# Patient Record
Sex: FEMALE | Race: WHITE | NOT HISPANIC OR LATINO
[De-identification: names, ages, dates, MRNs, and addresses within clinical notes are randomized per-mention and may not be internally consistent; named-entity substitution may affect disease eponyms.]

---

## 2018-07-25 ENCOUNTER — HOSPITAL (OUTPATIENT)
Dept: OTHER | Age: 48
End: 2018-07-25

## 2018-08-15 ENCOUNTER — HOSPITAL (OUTPATIENT)
Dept: OTHER | Age: 48
End: 2018-08-15

## 2019-02-28 ENCOUNTER — LAB REQUISITION (OUTPATIENT)
Dept: LAB | Facility: HOSPITAL | Age: 49
End: 2019-02-28
Payer: COMMERCIAL

## 2019-02-28 DIAGNOSIS — Z01.89 ENCOUNTER FOR OTHER SPECIFIED SPECIAL EXAMINATIONS: ICD-10-CM

## 2019-02-28 PROCEDURE — 87102 FUNGUS ISOLATION CULTURE: CPT | Performed by: OPHTHALMOLOGY

## 2019-02-28 PROCEDURE — 87077 CULTURE AEROBIC IDENTIFY: CPT | Performed by: OPHTHALMOLOGY

## 2019-02-28 PROCEDURE — 87206 SMEAR FLUORESCENT/ACID STAI: CPT | Performed by: OPHTHALMOLOGY

## 2019-02-28 PROCEDURE — 87075 CULTR BACTERIA EXCEPT BLOOD: CPT | Performed by: OPHTHALMOLOGY

## 2019-02-28 PROCEDURE — 87205 SMEAR GRAM STAIN: CPT | Performed by: OPHTHALMOLOGY

## 2019-02-28 PROCEDURE — 87070 CULTURE OTHR SPECIMN AEROBIC: CPT | Performed by: OPHTHALMOLOGY

## 2019-10-10 PROBLEM — I78.9 OTHER AND UNSPECIFIED CAPILLARY DISEASES: Status: ACTIVE | Noted: 2018-12-12

## 2019-10-10 PROBLEM — H35.30 MACULAR DEGENERATION (SENILE) OF RETINA: Status: ACTIVE | Noted: 2018-12-12

## 2019-10-10 PROBLEM — E78.00 PURE HYPERCHOLESTEROLEMIA: Status: ACTIVE | Noted: 2018-12-12

## 2019-10-10 PROBLEM — F41.1 GENERALIZED ANXIETY DISORDER: Status: ACTIVE | Noted: 2018-06-21

## 2019-10-10 PROBLEM — I10 BENIGN ESSENTIAL HYPERTENSION: Status: ACTIVE | Noted: 2018-12-05

## 2020-09-30 PROBLEM — M67.431 GANGLION CYST OF DORSUM OF RIGHT WRIST: Status: ACTIVE | Noted: 2020-09-30

## 2020-09-30 PROBLEM — M77.11 RIGHT LATERAL EPICONDYLITIS: Status: ACTIVE | Noted: 2020-09-30

## 2022-05-17 ENCOUNTER — IMAGING SERVICES (OUTPATIENT)
Dept: CT IMAGING | Age: 52
End: 2022-05-17
Attending: OTOLARYNGOLOGY

## 2022-05-17 DIAGNOSIS — J32.9 CHRONIC SINUSITIS: ICD-10-CM

## 2022-05-17 PROCEDURE — 70486 CT MAXILLOFACIAL W/O DYE: CPT | Performed by: STUDENT IN AN ORGANIZED HEALTH CARE EDUCATION/TRAINING PROGRAM

## 2024-02-08 ENCOUNTER — APPOINTMENT (OUTPATIENT)
Dept: URBAN - METROPOLITAN AREA CLINIC 245 | Age: 54
Setting detail: DERMATOLOGY
End: 2024-02-08

## 2024-02-08 DIAGNOSIS — L57.8 OTHER SKIN CHANGES DUE TO CHRONIC EXPOSURE TO NONIONIZING RADIATION: ICD-10-CM

## 2024-02-08 DIAGNOSIS — D18.0 HEMANGIOMA: ICD-10-CM

## 2024-02-08 DIAGNOSIS — L81.4 OTHER MELANIN HYPERPIGMENTATION: ICD-10-CM

## 2024-02-08 DIAGNOSIS — L82.1 OTHER SEBORRHEIC KERATOSIS: ICD-10-CM

## 2024-02-08 DIAGNOSIS — D49.2 NEOPLASM OF UNSPECIFIED BEHAVIOR OF BONE, SOFT TISSUE, AND SKIN: ICD-10-CM

## 2024-02-08 PROBLEM — D18.01 HEMANGIOMA OF SKIN AND SUBCUTANEOUS TISSUE: Status: ACTIVE | Noted: 2024-02-08

## 2024-02-08 PROCEDURE — OTHER COUNSELING: OTHER

## 2024-02-08 PROCEDURE — OTHER BIOPSY BY SHAVE METHOD: OTHER

## 2024-02-08 PROCEDURE — A4550 SURGICAL TRAYS: HCPCS

## 2024-02-08 PROCEDURE — OTHER MIPS QUALITY: OTHER

## 2024-02-08 PROCEDURE — 11102 TANGNTL BX SKIN SINGLE LES: CPT

## 2024-02-08 PROCEDURE — 99213 OFFICE O/P EST LOW 20 MIN: CPT | Mod: 25

## 2024-02-08 PROCEDURE — OTHER PRESCRIPTION MEDICATION MANAGEMENT: OTHER

## 2024-02-08 ASSESSMENT — LOCATION SIMPLE DESCRIPTION DERM
LOCATION SIMPLE: CHEST
LOCATION SIMPLE: LEFT UPPER BACK
LOCATION SIMPLE: RIGHT UPPER ARM
LOCATION SIMPLE: LEFT FOREHEAD
LOCATION SIMPLE: SUPERIOR FOREHEAD

## 2024-02-08 ASSESSMENT — LOCATION ZONE DERM
LOCATION ZONE: ARM
LOCATION ZONE: TRUNK
LOCATION ZONE: FACE

## 2024-02-08 ASSESSMENT — LOCATION DETAILED DESCRIPTION DERM
LOCATION DETAILED: RIGHT MEDIAL SUPERIOR CHEST
LOCATION DETAILED: RIGHT ANTERIOR PROXIMAL UPPER ARM
LOCATION DETAILED: MIDDLE STERNUM
LOCATION DETAILED: SUPERIOR MID FOREHEAD
LOCATION DETAILED: LEFT SUPERIOR MEDIAL UPPER BACK
LOCATION DETAILED: LEFT INFERIOR MEDIAL FOREHEAD

## 2024-02-08 NOTE — PROCEDURE: BIOPSY BY SHAVE METHOD
Body Location Override (Optional - Billing Will Still Be Based On Selected Body Map Location If Applicable): right upper arm
Detail Level: Detailed
Depth Of Biopsy: dermis
Was A Bandage Applied: Yes
Size Of Lesion In Cm: 0
Biopsy Type: H and E
Biopsy Method: Dermablade
Anesthesia Type: 1% lidocaine with epinephrine
Anesthesia Volume In Cc: 0.5
Hemostasis: Drysol
Wound Care: Petrolatum
Dressing: bandage
Destruction After The Procedure: No
Type Of Destruction Used: Curettage
Curettage Text: The wound bed was treated with curettage after the biopsy was performed.
Cryotherapy Text: The wound bed was treated with cryotherapy after the biopsy was performed.
Electrodesiccation Text: The wound bed was treated with electrodesiccation after the biopsy was performed.
Electrodesiccation And Curettage Text: The wound bed was treated with electrodesiccation and curettage after the biopsy was performed.
Silver Nitrate Text: The wound bed was treated with silver nitrate after the biopsy was performed.
Lab: -[id number]
Consent: Written consent was obtained and risks were reviewed including but not limited to scarring, infection, bleeding, scabbing, incomplete removal, nerve damage and allergy to anesthesia.
Post-Care Instructions: I reviewed with the patient in detail post-care instructions. Patient is to keep the biopsy site dry overnight, and then apply bacitracin twice daily until healed. Patient may apply hydrogen peroxide soaks to remove any crusting.
Notification Instructions: Patient will be notified of biopsy results. However, patient instructed to call the office if not contacted within 2 weeks.
Billing Type: Third-Party Bill
Information: Selecting Yes will display possible errors in your note based on the variables you have selected. This validation is only offered as a suggestion for you. PLEASE NOTE THAT THE VALIDATION TEXT WILL BE REMOVED WHEN YOU FINALIZE YOUR NOTE. IF YOU WANT TO FAX A PRELIMINARY NOTE YOU WILL NEED TO TOGGLE THIS TO 'NO' IF YOU DO NOT WANT IT IN YOUR FAXED NOTE.
21-Oct-2023

## 2024-02-08 NOTE — PROCEDURE: PRESCRIPTION MEDICATION MANAGEMENT
Detail Level: Zone
Initiate Treatment: .\\n- EltaMD UV clear, QDAILY
Render In Strict Bullet Format?: No

## 2024-09-25 NOTE — DISCHARGE INSTRUCTIONS
HOME INSTRUCTIONS  Go to doc appt tomorrow as scheduled  Bring eye drops to appt as instructed   Maintain eye shield until doc removes it  Keep head down until instructed to do otherwise by doc.  AMBSURG HOME CARE INSTRUCTIONS: POST-OP ANESTHESIA  The medication that you received for sedation or general anesthesia can last up to 24 hours. Your judgment and reflexes may be altered, even if you feel like your normal self.      We Recommend:   Do not drive any motor vehicle or bicycle   Avoid mowing the lawn, playing sports, or working with power tools/applicances (power saws, electric knives or mixers)   That you have someone stay with you on your first night home   Do not drink alcohol or take sleeping pills or tranquilizers   Do not sign legal documents within 24 hours of your procedure   If you had a nerve block for your surgery, take extra care not to put any pressure on your arm or hand for 24 hours    It is normal:  For you to have a sore throat if you had a breathing tube during surgery (while you were asleep!). The sore throat should get better within 48 hours. You can gargle with warm salt water (1/2 tsp in 4 oz warm water) or use a throat lozenge for comfort  To feel muscle aches or soreness especially in the abdomen, chest or neck. The achy feeling should go away in the next 24 hours  To feel weak, sleepy or \"wiped out\". Your should start feeling better in the next 24 hours.   To experience mild discomforts such as sore lip or tongue, headache, cramps, gas pains or a bloated feeling in your abdomen.   To experience mild back pain or soreness for a day or two if you had spinal or epidural anesthesia.   If you had laparoscopic surgery, to feel shoulder pain or discomfort on the day of surgery.   For some patients to have nausea after surgery/anesthesia    If you feel nausea or experience vomiting:   Try to move around less.   Eat less than usual or drink only liquids until the next morning   Nausea should  resolve in about 24 hours    If you have a problem when you are at home:    Call your surgeons office   Discharge Instructions: After Your Surgery  You’ve just had surgery. During surgery, you were given medicine called anesthesia to keep you relaxed and free of pain. After surgery, you may have some pain or nausea. This is common. Here are some tips for feeling better and getting well after surgery.   Going home  Your healthcare provider will show you how to take care of yourself when you go home. They'll also answer your questions. Have an adult family member or friend drive you home. For the first 24 hours after your surgery:   Don't drive or use heavy equipment.  Don't make important decisions or sign legal papers.  Take medicines as directed.  Don't drink alcohol.  Have someone stay with you, if needed. They can watch for problems and help keep you safe.  Be sure to go to all follow-up visits with your healthcare provider. And rest after your surgery for as long as your provider tells you to.   Coping with pain  If you have pain after surgery, pain medicine will help you feel better. Take it as directed, before pain becomes severe. Also, ask your healthcare provider or pharmacist about other ways to control pain. This might be with heat, ice, or relaxation. And follow any other instructions your surgeon or nurse gives you.      Stay on schedule with your medicine.     Tips for taking pain medicine  To get the best relief possible, remember these points:   Pain medicines can upset your stomach. Taking them with a little food may help.  Most pain relievers taken by mouth need at least 20 to 30 minutes to start to work.  Don't wait till your pain becomes severe before you take your medicine. Try to time your medicine so that you can take it before starting an activity. This might be before you get dressed, go for a walk, or sit down for dinner.  Constipation is a common side effect of some pain medicines. Call  your healthcare provider before taking any medicines such as laxatives or stool softeners to help ease constipation. Also ask if you should skip any foods. Drinking lots of fluids and eating foods such as fruits and vegetables that are high in fiber can also help. Remember, don't take laxatives unless your surgeon has prescribed them.  Drinking alcohol and taking pain medicine can cause dizziness and slow your breathing. It can even be deadly. Don't drink alcohol while taking pain medicine.  Pain medicine can make you react more slowly to things. Don't drive or run machinery while taking pain medicine.  Your healthcare provider may tell you to take acetaminophen to help ease your pain. Ask them how much you're supposed to take each day. Acetaminophen or other pain relievers may interact with your prescription medicines or other over-the-counter (OTC) medicines. Some prescription medicines have acetaminophen and other ingredients in them. Using both prescription and OTC acetaminophen for pain can cause you to accidentally overdose. Read the labels on your OTC medicines with care. This will help you to clearly know the list of ingredients, how much to take, and any warnings. It may also help you not take too much acetaminophen. If you have questions or don't understand the information, ask your pharmacist or healthcare provider to explain it to you before you take the OTC medicine.   Managing nausea  Some people have an upset stomach (nausea) after surgery. This is often because of anesthesia, pain, or pain medicine, less movement of food in the stomach, or the stress of surgery. These tips will help you handle nausea and eat healthy foods as you get better. If you were on a special food plan before surgery, ask your healthcare provider if you should follow it while you get better. Check with your provider on how your eating should progress. It may depend on the surgery you had. These general tips may help:   Don't  push yourself to eat. Your body will tell you when to eat and how much.  Start off with clear liquids and soup. They're easier to digest.  Next try semi-solid foods as you feel ready. These include mashed potatoes, applesauce, and gelatin.  Slowly move to solid foods. Don’t eat fatty, rich, or spicy foods at first.  Don't force yourself to have 3 large meals a day. Instead eat smaller amounts more often.  Take pain medicines with a small amount of solid food, such as crackers or toast. This helps prevent nausea.  When to call your healthcare provider  Call your healthcare provider right away if you have any of these:   You still have too much pain, or the pain gets worse, after taking the medicine. The medicine may not be strong enough. Or there may be a complication from the surgery.  You feel too sleepy, dizzy, or groggy. The medicine may be too strong.  Side effects such as nausea or vomiting. Your healthcare provider may advise taking other medicines to .  Skin changes such as rash, itching, or hives. This may mean you have an allergic reaction. Your provider may advise taking other medicines.  The incision looks different (for instance, part of it opens up).  Bleeding or fluid leaking from the incision site, and weren't told to expect that.  Fever of 100.4°F (38°C) or higher, or as directed by your provider.  Call 911  Call 911 right away if you have:   Trouble breathing  Facial swelling    If you have obstructive sleep apnea   You were given anesthesia medicine during surgery to keep you comfortable and free of pain. After surgery, you may have more apnea spells because of this medicine and other medicines you were given. The spells may last longer than normal.    At home:  Keep using the continuous positive airway pressure (CPAP) device when you sleep. Unless your healthcare provider tells you not to, use it when you sleep, day or night. CPAP is a common device used to treat obstructive sleep apnea.  Talk  with your provider before taking any pain medicine, muscle relaxants, or sedatives. Your provider will tell you about the possible dangers of taking these medicines.  Contact your provider if your sleeping changes a lot even when taking medicines as directed.  [place] last reviewed this educational content on 10/1/2021  © 0190-4962 [employer]. All rights reserved. This information is not intended as a substitute for professional medical care. Always follow your healthcare professional's instructions.        Discharge Instructions: After Your Surgery  You’ve just had surgery. During surgery, you were given medicine called anesthesia to keep you relaxed and free of pain. After surgery, you may have some pain or nausea. This is common. Here are some tips for feeling better and getting well after surgery.   Going home  Your healthcare provider will show you how to take care of yourself when you go home. They'll also answer your questions. Have an adult family member or friend drive you home. For the first 24 hours after your surgery:   Don't drive or use heavy equipment.  Don't make important decisions or sign legal papers.  Take medicines as directed.  Don't drink alcohol.  Have someone stay with you, if needed. They can watch for problems and help keep you safe.  Be sure to go to all follow-up visits with your healthcare provider. And rest after your surgery for as long as your provider tells you to.   Coping with pain  If you have pain after surgery, pain medicine will help you feel better. Take it as directed, before pain becomes severe. Also, ask your healthcare provider or pharmacist about other ways to control pain. This might be with heat, ice, or relaxation. And follow any other instructions your surgeon or nurse gives you.      Stay on schedule with your medicine.     Tips for taking pain medicine  To get the best relief possible, remember these points:   Pain medicines can upset your stomach.  Taking them with a little food may help.  Most pain relievers taken by mouth need at least 20 to 30 minutes to start to work.  Don't wait till your pain becomes severe before you take your medicine. Try to time your medicine so that you can take it before starting an activity. This might be before you get dressed, go for a walk, or sit down for dinner.  Constipation is a common side effect of some pain medicines. Call your healthcare provider before taking any medicines such as laxatives or stool softeners to help ease constipation. Also ask if you should skip any foods. Drinking lots of fluids and eating foods such as fruits and vegetables that are high in fiber can also help. Remember, don't take laxatives unless your surgeon has prescribed them.  Drinking alcohol and taking pain medicine can cause dizziness and slow your breathing. It can even be deadly. Don't drink alcohol while taking pain medicine.  Pain medicine can make you react more slowly to things. Don't drive or run machinery while taking pain medicine.  Your healthcare provider may tell you to take acetaminophen to help ease your pain. Ask them how much you're supposed to take each day. Acetaminophen or other pain relievers may interact with your prescription medicines or other over-the-counter (OTC) medicines. Some prescription medicines have acetaminophen and other ingredients in them. Using both prescription and OTC acetaminophen for pain can cause you to accidentally overdose. Read the labels on your OTC medicines with care. This will help you to clearly know the list of ingredients, how much to take, and any warnings. It may also help you not take too much acetaminophen. If you have questions or don't understand the information, ask your pharmacist or healthcare provider to explain it to you before you take the OTC medicine.   Managing nausea  Some people have an upset stomach (nausea) after surgery. This is often because of anesthesia, pain, or  pain medicine, less movement of food in the stomach, or the stress of surgery. These tips will help you handle nausea and eat healthy foods as you get better. If you were on a special food plan before surgery, ask your healthcare provider if you should follow it while you get better. Check with your provider on how your eating should progress. It may depend on the surgery you had. These general tips may help:   Don't push yourself to eat. Your body will tell you when to eat and how much.  Start off with clear liquids and soup. They're easier to digest.  Next try semi-solid foods as you feel ready. These include mashed potatoes, applesauce, and gelatin.  Slowly move to solid foods. Don’t eat fatty, rich, or spicy foods at first.  Don't force yourself to have 3 large meals a day. Instead eat smaller amounts more often.  Take pain medicines with a small amount of solid food, such as crackers or toast. This helps prevent nausea.  When to call your healthcare provider  Call your healthcare provider right away if you have any of these:   You still have too much pain, or the pain gets worse, after taking the medicine. The medicine may not be strong enough. Or there may be a complication from the surgery.  You feel too sleepy, dizzy, or groggy. The medicine may be too strong.  Side effects such as nausea or vomiting. Your healthcare provider may advise taking other medicines to .  Skin changes such as rash, itching, or hives. This may mean you have an allergic reaction. Your provider may advise taking other medicines.  The incision looks different (for instance, part of it opens up).  Bleeding or fluid leaking from the incision site, and weren't told to expect that.  Fever of 100.4°F (38°C) or higher, or as directed by your provider.  Call 911  Call 911 right away if you have:   Trouble breathing  Facial swelling    If you have obstructive sleep apnea   You were given anesthesia medicine during surgery to keep you  comfortable and free of pain. After surgery, you may have more apnea spells because of this medicine and other medicines you were given. The spells may last longer than normal.    At home:  Keep using the continuous positive airway pressure (CPAP) device when you sleep. Unless your healthcare provider tells you not to, use it when you sleep, day or night. CPAP is a common device used to treat obstructive sleep apnea.  Talk with your provider before taking any pain medicine, muscle relaxants, or sedatives. Your provider will tell you about the possible dangers of taking these medicines.  Contact your provider if your sleeping changes a lot even when taking medicines as directed.  [employer] last reviewed this educational content on 10/1/2021  © 6222-7742 [employer]. All rights reserved. This information is not intended as a substitute for professional medical care. Always follow your healthcare professional's instructions.

## 2024-09-26 ENCOUNTER — HOSPITAL ENCOUNTER (OUTPATIENT)
Facility: HOSPITAL | Age: 54
Setting detail: HOSPITAL OUTPATIENT SURGERY
Discharge: HOME | End: 2024-09-26
Attending: OPHTHALMOLOGY | Admitting: OPHTHALMOLOGY
Payer: COMMERCIAL

## 2024-09-26 ENCOUNTER — ANESTHESIA (OUTPATIENT)
Dept: SURGERY | Facility: HOSPITAL | Age: 54
End: 2024-09-26
Payer: COMMERCIAL

## 2024-09-26 ENCOUNTER — ANESTHESIA EVENT (OUTPATIENT)
Dept: SURGERY | Facility: HOSPITAL | Age: 54
End: 2024-09-26
Payer: COMMERCIAL

## 2024-09-26 VITALS
HEIGHT: 62 IN | BODY MASS INDEX: 43.43 KG/M2 | OXYGEN SATURATION: 97 % | WEIGHT: 236 LBS | DIASTOLIC BLOOD PRESSURE: 83 MMHG | RESPIRATION RATE: 14 BRPM | TEMPERATURE: 98 F | SYSTOLIC BLOOD PRESSURE: 140 MMHG | HEART RATE: 74 BPM

## 2024-09-26 LAB — B-HCG UR QL: NEGATIVE

## 2024-09-26 PROCEDURE — 81025 URINE PREGNANCY TEST: CPT

## 2024-09-26 PROCEDURE — 08T53ZZ RESECTION OF LEFT VITREOUS, PERCUTANEOUS APPROACH: ICD-10-PCS | Performed by: OPHTHALMOLOGY

## 2024-09-26 PROCEDURE — 08NF3ZZ RELEASE LEFT RETINA, PERCUTANEOUS APPROACH: ICD-10-PCS | Performed by: OPHTHALMOLOGY

## 2024-09-26 RX ORDER — SODIUM CHLORIDE, SODIUM LACTATE, POTASSIUM CHLORIDE, CALCIUM CHLORIDE 600; 310; 30; 20 MG/100ML; MG/100ML; MG/100ML; MG/100ML
INJECTION, SOLUTION INTRAVENOUS CONTINUOUS
Status: DISCONTINUED | OUTPATIENT
Start: 2024-09-26 | End: 2024-09-26

## 2024-09-26 RX ORDER — MORPHINE SULFATE 10 MG/ML
6 INJECTION, SOLUTION INTRAMUSCULAR; INTRAVENOUS EVERY 10 MIN PRN
Status: DISCONTINUED | OUTPATIENT
Start: 2024-09-26 | End: 2024-09-26

## 2024-09-26 RX ORDER — ATROPINE SULFATE 10 MG/ML
1 SOLUTION/ DROPS OPHTHALMIC
Status: COMPLETED | OUTPATIENT
Start: 2024-09-26 | End: 2024-09-26

## 2024-09-26 RX ORDER — ONDANSETRON 2 MG/ML
INJECTION INTRAMUSCULAR; INTRAVENOUS AS NEEDED
Status: DISCONTINUED | OUTPATIENT
Start: 2024-09-26 | End: 2024-09-26 | Stop reason: SURG

## 2024-09-26 RX ORDER — ATROPINE SULFATE 10 MG/ML
SOLUTION/ DROPS OPHTHALMIC AS NEEDED
Status: DISCONTINUED | OUTPATIENT
Start: 2024-09-26 | End: 2024-09-26 | Stop reason: HOSPADM

## 2024-09-26 RX ORDER — FAMOTIDINE 20 MG/1
20 TABLET, FILM COATED ORAL ONCE
Status: COMPLETED | OUTPATIENT
Start: 2024-09-26 | End: 2024-09-26

## 2024-09-26 RX ORDER — MORPHINE SULFATE 4 MG/ML
2 INJECTION, SOLUTION INTRAMUSCULAR; INTRAVENOUS EVERY 10 MIN PRN
Status: DISCONTINUED | OUTPATIENT
Start: 2024-09-26 | End: 2024-09-26

## 2024-09-26 RX ORDER — ACETAMINOPHEN 500 MG
1000 TABLET ORAL ONCE
Status: COMPLETED | OUTPATIENT
Start: 2024-09-26 | End: 2024-09-26

## 2024-09-26 RX ORDER — MORPHINE SULFATE 4 MG/ML
4 INJECTION, SOLUTION INTRAMUSCULAR; INTRAVENOUS EVERY 10 MIN PRN
Status: DISCONTINUED | OUTPATIENT
Start: 2024-09-26 | End: 2024-09-26

## 2024-09-26 RX ORDER — HYDROMORPHONE HYDROCHLORIDE 1 MG/ML
0.6 INJECTION, SOLUTION INTRAMUSCULAR; INTRAVENOUS; SUBCUTANEOUS EVERY 5 MIN PRN
Status: DISCONTINUED | OUTPATIENT
Start: 2024-09-26 | End: 2024-09-26

## 2024-09-26 RX ORDER — PHENYLEPHRINE HYDROCHLORIDE 25 MG/ML
2 SOLUTION/ DROPS OPHTHALMIC
Status: COMPLETED | OUTPATIENT
Start: 2024-09-26 | End: 2024-09-26

## 2024-09-26 RX ORDER — LIDOCAINE HYDROCHLORIDE 10 MG/ML
INJECTION, SOLUTION EPIDURAL; INFILTRATION; INTRACAUDAL; PERINEURAL AS NEEDED
Status: DISCONTINUED | OUTPATIENT
Start: 2024-09-26 | End: 2024-09-26 | Stop reason: SURG

## 2024-09-26 RX ORDER — INDOCYANINE GREEN AND WATER 25 MG
KIT INJECTION AS NEEDED
Status: DISCONTINUED | OUTPATIENT
Start: 2024-09-26 | End: 2024-09-26 | Stop reason: HOSPADM

## 2024-09-26 RX ORDER — GENTAMICIN SULFATE 3 MG/ML
2 SOLUTION/ DROPS OPHTHALMIC
Status: COMPLETED | OUTPATIENT
Start: 2024-09-26 | End: 2024-09-26

## 2024-09-26 RX ORDER — NALOXONE HYDROCHLORIDE 0.4 MG/ML
80 INJECTION, SOLUTION INTRAMUSCULAR; INTRAVENOUS; SUBCUTANEOUS AS NEEDED
Status: DISCONTINUED | OUTPATIENT
Start: 2024-09-26 | End: 2024-09-26

## 2024-09-26 RX ORDER — METOCLOPRAMIDE 10 MG/1
10 TABLET ORAL ONCE
Status: COMPLETED | OUTPATIENT
Start: 2024-09-26 | End: 2024-09-26

## 2024-09-26 RX ORDER — FAMOTIDINE 10 MG/ML
20 INJECTION, SOLUTION INTRAVENOUS ONCE
Status: COMPLETED | OUTPATIENT
Start: 2024-09-26 | End: 2024-09-26

## 2024-09-26 RX ORDER — BALANCED SALT SOLUTION 6.4; .75; .48; .3; 3.9; 1.7 MG/ML; MG/ML; MG/ML; MG/ML; MG/ML; MG/ML
SOLUTION OPHTHALMIC AS NEEDED
Status: DISCONTINUED | OUTPATIENT
Start: 2024-09-26 | End: 2024-09-26 | Stop reason: HOSPADM

## 2024-09-26 RX ORDER — HYDROMORPHONE HYDROCHLORIDE 1 MG/ML
0.2 INJECTION, SOLUTION INTRAMUSCULAR; INTRAVENOUS; SUBCUTANEOUS EVERY 5 MIN PRN
Status: DISCONTINUED | OUTPATIENT
Start: 2024-09-26 | End: 2024-09-26

## 2024-09-26 RX ORDER — METOCLOPRAMIDE HYDROCHLORIDE 5 MG/ML
10 INJECTION INTRAMUSCULAR; INTRAVENOUS ONCE
Status: COMPLETED | OUTPATIENT
Start: 2024-09-26 | End: 2024-09-26

## 2024-09-26 RX ORDER — HYDROMORPHONE HYDROCHLORIDE 1 MG/ML
0.4 INJECTION, SOLUTION INTRAMUSCULAR; INTRAVENOUS; SUBCUTANEOUS EVERY 5 MIN PRN
Status: DISCONTINUED | OUTPATIENT
Start: 2024-09-26 | End: 2024-09-26

## 2024-09-26 RX ORDER — DEXAMETHASONE SODIUM PHOSPHATE 4 MG/ML
VIAL (ML) INJECTION AS NEEDED
Status: DISCONTINUED | OUTPATIENT
Start: 2024-09-26 | End: 2024-09-26 | Stop reason: SURG

## 2024-09-26 RX ADMIN — DEXAMETHASONE SODIUM PHOSPHATE 4 MG: 4 MG/ML VIAL (ML) INJECTION at 10:21:00

## 2024-09-26 RX ADMIN — ONDANSETRON 4 MG: 2 INJECTION INTRAMUSCULAR; INTRAVENOUS at 10:21:00

## 2024-09-26 RX ADMIN — LIDOCAINE HYDROCHLORIDE 50 MG: 10 INJECTION, SOLUTION EPIDURAL; INFILTRATION; INTRACAUDAL; PERINEURAL at 10:21:00

## 2024-09-26 NOTE — ANESTHESIA PREPROCEDURE EVALUATION
Anesthesia PreOp Note    HPI:     Akilah Franco is a 54 year old female who presents for preoperative consultation requested by: Kar Borja MD    Date of Surgery: 9/26/2024    Procedure(s):  Pars plana vitrectomy, membrane peeling, gas fluid exchange left eye  Indication: Macular hole left eye    Relevant Problems   No relevant active problems       NPO:  Last Liquid Consumption Date: 09/25/24  Last Liquid Consumption Time: 2000  Last Solid Consumption Date: 09/25/24  Last Solid Consumption Time: 2000  Last Liquid Consumption Date: 09/25/24          History Review:  Patient Active Problem List    Diagnosis Date Noted    Ganglion cyst of dorsum of right wrist 09/30/2020    Right lateral epicondylitis 09/30/2020    Macular degeneration (senile) of retina 12/12/2018    Other and unspecified capillary diseases 12/12/2018    Pure hypercholesterolemia 12/12/2018    Benign essential hypertension 12/05/2018    Generalized anxiety disorder 06/21/2018       Past Medical History:    High blood pressure    OTHER DISEASES    retinopathy    Visual impairment       Past Surgical History:   Procedure Laterality Date    Colonoscopy      Eye surgery         Medications Prior to Admission   Medication Sig Dispense Refill Last Dose    AMLODIPINE 5 MG Oral Tab TAKE 1 TABLET(5 MG) BY MOUTH EVERY DAY 90 tablet 0 9/26/2024 at 0800    Multiple Vitamin (MULTI-VITAMIN DAILY OR) Take by mouth.   Past Week    ergocalciferol 1.25 MG (41395 UT) Oral Cap Take 1 capsule (50,000 Units total) by mouth every 7 days. 12 capsule 0 Past Week    ALPRAZolam 0.25 MG Oral Tab Take 1 tablet (0.25 mg total) by mouth nightly as needed. 30 tablet 5 More than a month     Current Facility-Administered Medications Ordered in Epic   Medication Dose Route Frequency Provider Last Rate Last Admin    lactated ringers infusion   Intravenous Continuous Kar Borja MD 20 mL/hr at 09/26/24 0904 New Bag at 09/26/24 0904     No current [facility]-ordered outpatient  medications on file.       Allergies   Allergen Reactions    Ace Inhibitors Coughing       Family History   Family history unknown: Yes     Social History     Socioeconomic History    Marital status: Single   Tobacco Use    Smoking status: Never    Smokeless tobacco: Never   Substance and Sexual Activity    Alcohol use: Yes     Comment: drinks a few drinks 2-3 times/week    Drug use: No       Available pre-op labs reviewed.  Lab Results   Component Value Date    URINEPREG Negative 09/26/2024             Vital Signs:  Body mass index is 43.16 kg/m².   height is 1.575 m (5' 2\") and weight is 107 kg (236 lb). Her oral temperature is 98.3 °F (36.8 °C). Her blood pressure is 148/82 and her pulse is 69. Her respiration is 18 and oxygen saturation is 98%.   Vitals:    09/21/24 1241 09/26/24 0859 09/26/24 0915   BP:   148/82   Pulse:   69   Resp:   18   Temp:   98.3 °F (36.8 °C)   TempSrc:  Oral Oral   SpO2:   98%   Weight: 99.8 kg (220 lb) 107 kg (236 lb) 107 kg (236 lb)   Height: 1.575 m (5' 2\")          Anesthesia Evaluation     Patient summary reviewed and Nursing notes reviewed    No history of anesthetic complications   Airway   Mallampati: II  Neck ROM: full  Dental      Pulmonary - negative ROS and normal exam   Cardiovascular - negative ROS and normal exam  (+) hypertension    Neuro/Psych - negative ROS     GI/Hepatic/Renal - negative ROS     Endo/Other - negative ROS   Abdominal  - normal exam  (+) obese                 Anesthesia Plan:   ASA:  2  Plan:   General  Airway:  LMA  Post-op Pain Management: IV analgesics  Informed Consent Plan and Risks Discussed With:  Patient  Discussed plan with:  CRNA      I have informed Akilah Franco and/or legal guardian or family member of the nature of the anesthetic plan, benefits, risks including possible dental damage if relevant, major complications, and any alternative forms of anesthetic management.   All of the patient's questions were answered to the best of my  ability. The patient desires the anesthetic management as planned.  MAURA GARCIA MD  9/26/2024 9:29 AM  Present on Admission:  **None**

## 2024-09-26 NOTE — ANESTHESIA POSTPROCEDURE EVALUATION
Patient: Akilah Franco    Procedure Summary       Date: 09/26/24 Room / Location: [facility] MAIN OR 03 / [facility] MAIN OR    Anesthesia Start: 1018 Anesthesia Stop:     Procedure: Pars plana vitrectomy, membrane peeling, gas fluid exchange left eye C3F8 (Left) Diagnosis: (Macular hole left eye)    Surgeons: Kar Borja MD Anesthesiologist: Bernardo Worley MD    Anesthesia Type: general ASA Status: 2            Anesthesia Type: general    Vitals Value Taken Time   /94 09/26/24 1122   Temp 98.6 °F (37 °C) 09/26/24 1112   Pulse 67 09/26/24 1129   Resp 12 09/26/24 1129   SpO2 96 % 09/26/24 1129   Vitals shown include unfiled device data.    [facility] AN Post Evaluation:   Patient Evaluated in PACU  Patient Participation: complete - patient participated  Level of Consciousness: sleepy but conscious  Pain Score: 0  Pain Management: adequate  Airway Patency:patent  Dental exam unchanged from preop  Yes    Cardiovascular Status: acceptable  Respiratory Status: acceptable  Postoperative Hydration acceptable      Pepito Holland CRNA  9/26/2024 11:29 AM

## 2024-09-26 NOTE — ANESTHESIA PROCEDURE NOTES
Airway  Date/Time: 9/26/2024 10:22 AM  Urgency: elective    Airway not difficult    General Information and Staff    Patient location during procedure: OR  Anesthesiologist: Bernardo Worley MD  Resident/CRNA: Pepito Holland CRNA  Performed: CRNA   Performed by: Pepito Holland CRNA  Authorized by: Bernardo Worley MD      Indications and Patient Condition  Indications for airway management: airway protection and anesthesia  Spontaneous ventilation: present  Sedation level: deep  Preoxygenated: yes  Patient position: sniffing  MILS maintained throughout  Mask difficulty assessment: 1 - vent by mask  No planned trial extubation    Final Airway Details  Final airway type: supraglottic airway      Successful airway: classic  Size 4      Additional Comments  Atraumatic / bite block in place. chipped front teeth noted prior to LMA insertion.

## 2024-09-26 NOTE — BRIEF OP NOTE
Pre-Operative Diagnosis: Macular hole left eye     Post-Operative Diagnosis: Macular hole left eye      Procedure Performed:   Pars Plana Vitrectomy, Membrane Peeling (ILM), Gas/Fluid Exchange, 13% C3F8 Left Eye    Surgeons and Role:     * Kar Borja MD - Primary    Assistant(s):   None     Surgical Findings: Macular Hole Left Eye     Specimen: None     Estimated Blood Loss: No data recorded    Dictation Number:      Kar Borja MD, MD  9/26/2024  11:06 AM

## 2024-09-27 NOTE — OPERATIVE REPORT
[facility]    PATIENT'S NAME: JEANMARIE JALLOH   ATTENDING PHYSICIAN: Kar Borja MD   OPERATING PHYSICIAN: Kar Borja MD   PATIENT ACCOUNT#:   [id number]    LOCATION:  [facility]  MEDICAL RECORD #:   [id number]       YOB: 1970  ADMISSION DATE:       09/26/2024      OPERATION DATE:  09/26/2024    OPERATIVE REPORT    PREOPERATIVE DIAGNOSIS:  Macular hole, left eye.  POSTOPERATIVE DIAGNOSIS:  Macular hole, left eye.  PROCEDURE:  Pars plana vitrectomy, membrane peeling (internal limiting membrane), gas-fluid exchange, and injection of a 13% mixture of filtered perfluoropropane gas, left eye.    ANESTHESIA:  General with LMA insertion plus retrobulbar block, left eye.    COMPLICATIONS:  None.    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  She has also undergone prior partial pars plana vitrectomy with intravitreal antibiotic injections for presumed endophthalmitis in 2019.  She has developed progressively worsening central vision.  Her most recent examination revealed a visual acuity of 20/25 right eye and 20/30 -2 left eye.  External and extraocular motility examinations were normal.  Slit lamp examination of each eye revealed a trace posterior subcapsular cataract.  Her intraocular pressures measured 15 right eye and 17 left eye.  Dilated fundus examination of the right eye revealed a cup-to-disc ratio of 0.3.  The retinal arterioles were normal.  Trace pigment mottling could be seen in the superotemporal portions of the macula.  There were no telangiectatic vessels seen.  A localized retinal detachment with isolated retinal tear could be seen superonasally.  Good barrier laser photocoagulation scarring could be seen surrounding the detachment.  There were no additional retinal tears, holes, or breaks  identified for 360 degrees.  There was a posterior vitreous detachment.  Dilated fundus examination of the left eye revealed a cup-to-disc ratio of 0.4.  The retinal arterioles were normal.  There was a small stage 2 macular hole centrally with trace cellophane maculopathy.  Pigment mottling could be seen in the inferonasal portion of the macula with equivocal parafoveal telangiectasis inferiorly.  A pigmented vitreoretinal tuft could be seen superonasally.  There were no open retinal tears, holes, or breaks identified for 360 degrees.  There was a posterior vitreous detachment.  After a thorough discussion of the risks, benefits, and alternative treatment options, pars plana vitrectomy with membrane peeling and gas-fluid exchange was advised for her left eye.  The risks of surgery discussed included, but were not limited to pain, infection, bleeding, redness, loss of vision, loss of the eye, need for further surgery, retinal detachment, persistent macular hole, cataract progression, glaucoma, corneal decompensation, ptosis, diplopia, crossing of the eyes, scarring, swelling, optic neuropathy as well as anesthetic-related risks such as death, myocardial infarction, stroke, pulmonary embolism, etc.  After careful consideration of the above discussion, the patient wished to proceed with the planned surgery for the left eye.    OPERATIVE TECHNIQUE:  The patient was brought to the operating room where general anesthesia with LMA insertion was induced without difficulty by the department of anesthesiology.  A retrobulbar injection of a 50/50 mixture of 2% lidocaine and 0.75% Marcaine was given to the retrobulbar space of the left eye using a flat grind, 25-gauge needle.  A total of 4 mL of this anesthetic was delivered to the left retrobulbar space to help control both intraoperative and postoperative pain.  The left eye was then prepped and draped in usual sterile manner.  A heavy wire lid speculum was placed between  the lids of the left eye.  A caliper was used to measure 4.0 mm posterior to the corneoscleral limbus in the inferotemporal quadrant.  A 25-gauge trocar was placed in a beveled fashion after appropriate displacement of the conjunctiva through this 4.0 mm evelyn.  The trocar was directed into the vitreous cavity and connected to a 25-gauge infusion cannula.  The position of the infusion cannula was identified within the vitreous cavity and found to be free of retinal tissue.  The infusion was turned on.  Two additional 25-gauge trocars were then placed, 1 in the superotemporal, 1 in the superonasal quadrant.  Each of these trocars were placed 4.0 mm posterior to the corneoscleral limbus in a beveled fashion after appropriate displacement of the conjunctiva.  Viscoat was placed over the cornea of the left eye.  The microscope was positioned over the cornea of the left eye using the binocular indirect operating microscope attachment.  A light pipe was inserted into the superotemporal sclerotomy site and a Microvit into the superonasal sclerotomy site.  With suction set between 50 and 600 mmHg and cutting set at 20,000 cuts per minute, an anterior and core vitrectomy were performed.  As the vitreous was removed, the vitrectomy was carried out further posteriorly and peripherally.  The posterior hyaloid was entered into the superotemporal quadrant and opened in a \"can opener\" fashion for 360 degrees.  The vitreous was then trimmed anteriorly to an appropriate vitreous skirt for 360 degrees.  Aspiration was applied in a peripapillary distribution to confirm complete separation of the posterior hyaloid.  Once the vitreous had been adequately removed, the Microvit was removed and indocyanine green dye aspirated into the Microvit tubing.  The Microvit was then reinserted into the superonasal sclerotomy site and the ICG dye actively refluxed into the vitreous cavity.  The instruments were removed.  Approximately 90 seconds were  allowed to elapse for staining of the internal limiting membrane.  The light pipe was then reinserted into the superotemporal sclerotomy site and a Microvit into the superonasal sclerotomy site.  The ICG dye was actively evacuated from the vitreous cavity.  The instruments were then removed.  The binocular indirect operating microscope attachment was removed and a [place] disposable contact lens was placed over the cornea of the left eye using Viscoat as an interface.  A light pipe was inserted into the superotemporal sclerotomy site and a flex loop wire membrane scraper inserted into the superonasal sclerotomy site.  Membranes within the posterior pole were brushed with a membrane scraper.  An edge to the internal limiting membrane was created along the inferotemporal arcade.  The membrane scraper was removed and an internal limiting membrane forceps placed through the superonasal sclerotomy site.  The elevated edge of the internal limiting membrane was grasped and a very large sheet of internal limiting membrane removed from the inferotemporal to the superotemporal arcade and from the optic discs to roughly 2 disc macular distances temporal to the macula.  The internal limiting membrane was removed in a single sheet.  The instruments were removed and the contact lens was removed.  The binocular indirect operating microscope attachment was replaced over the microscope.  A light pipe was inserted into the superotemporal sclerotomy site and a Microvit into the superonasal sclerotomy site.  The free-floating freed internal limiting membrane was then evacuated from the vitreous cavity.  The Microvit was removed and a soft-tipped needle and suction placed through the superonasal sclerotomy site.  An air-fluid exchange was performed to the level of the retina.  Approximately 10 minutes were allowed to elapse for further settling of the vitreous fluid posteriorly.  This was then removed using the soft-tipped needle and  suction.  The instruments were removed.  A smokestack was inserted into the superonasal sclerotomy site.  The infusion cannula was then connected to a 60 mL syringe filled with a 13% mixture of filtered perfluoropropane gas.  Approximately 50 mL of the gas was irrigated through the vitreous cavity with air being allowed to escape through the smokestack.  The superior trocars were then removed and a cotton-tipped applicator was placed over each of the superior sclerotomy sites for a period of approximately 10 seconds.  The superotemporal sclerotomy was found to be airtight, though the superonasal sclerotomy was leaking moderately.  A single transconjunctival 6-0 plain suture was passed through the superonasal sclerotomy site.  The suture was tightened and tied permanently.  The ends were trimmed short.  The wound was checked and found to be airtight.  The intraocular pressure was checked and felt to be appropriate.  The infusion cannula and inferotemporal trocar were removed, and a cotton-tipped applicator was placed over the inferotemporal sclerotomy site for a period of approximately 10 seconds.  The wound was checked and found to be airtight.  The intraocular pressure was checked once again and found to be appropriate.  Atropine 1% drops were placed over the cornea of the left eye.  TobraDex ointment was placed over the cornea of the left eye.  The lid speculum was removed and the eyelids closed.  An eye patch and Cobb shield were placed over the left eye.  The patient was awakened from anesthesia and brought to the recovery room in excellent condition after having tolerated the procedure well.  There were no complications.    Dictated By Kar Borja MD  d: 09/26/2024 11:16:27  t: 09/26/2024 21:35:36  Job [id number]  [facility]/

## 2025-03-05 ENCOUNTER — APPOINTMENT (OUTPATIENT)
Dept: URBAN - METROPOLITAN AREA CLINIC 245 | Age: 55
Setting detail: DERMATOLOGY
End: 2025-03-05

## 2025-03-05 DIAGNOSIS — D485 NEOPLASM OF UNCERTAIN BEHAVIOR OF SKIN: ICD-10-CM

## 2025-03-05 DIAGNOSIS — L81.4 OTHER MELANIN HYPERPIGMENTATION: ICD-10-CM

## 2025-03-05 DIAGNOSIS — D18.0 HEMANGIOMA: ICD-10-CM

## 2025-03-05 PROBLEM — D48.5 NEOPLASM OF UNCERTAIN BEHAVIOR OF SKIN: Status: ACTIVE | Noted: 2025-03-05

## 2025-03-05 PROBLEM — D18.01 HEMANGIOMA OF SKIN AND SUBCUTANEOUS TISSUE: Status: ACTIVE | Noted: 2025-03-05

## 2025-03-05 PROCEDURE — OTHER COUNSELING: OTHER

## 2025-03-05 PROCEDURE — 11300 SHAVE SKIN LESION 0.5 CM/<: CPT

## 2025-03-05 PROCEDURE — OTHER MIPS QUALITY: OTHER

## 2025-03-05 PROCEDURE — 99213 OFFICE O/P EST LOW 20 MIN: CPT | Mod: 25

## 2025-03-05 PROCEDURE — A4550 SURGICAL TRAYS: HCPCS

## 2025-03-05 PROCEDURE — OTHER SHAVE REMOVAL: OTHER

## 2025-03-05 ASSESSMENT — LOCATION SIMPLE DESCRIPTION DERM
LOCATION SIMPLE: CHEST
LOCATION SIMPLE: RIGHT BUTTOCK
LOCATION SIMPLE: LEFT FOREHEAD

## 2025-03-05 ASSESSMENT — LOCATION DETAILED DESCRIPTION DERM
LOCATION DETAILED: RIGHT BUTTOCK
LOCATION DETAILED: LEFT INFERIOR MEDIAL FOREHEAD
LOCATION DETAILED: MIDDLE STERNUM
LOCATION DETAILED: RIGHT MEDIAL SUPERIOR CHEST

## 2025-03-05 ASSESSMENT — LOCATION ZONE DERM
LOCATION ZONE: TRUNK
LOCATION ZONE: FACE

## 2025-03-05 NOTE — PROCEDURE: SHAVE REMOVAL
Notification Instructions: Patient will be notified of pathology results. However, patient instructed to call the office if not contacted within 2 weeks.
Bill For Surgical Tray: yes
Depth Of Shave: dermis
Wound Care: Petrolatum
Anesthesia Volume In Cc: 0.3
Path Notes (To The Dermatopathologist): check margins
Add Variable For Additional Medical Justification: No
Body Location Override (Optional - Billing Will Still Be Based On Selected Body Map Location If Applicable): right buttock
Lab Facility: 0
Post-Care Instructions: I reviewed with the patient in detail post-care instructions. Patient is to keep the biopsy site dry overnight, and then apply Vaseline
Anticipated Plan (Based On Presumed Biopsy Results): The intent of today's procedure was to remove the entire atypical pigmented lesion in hopes that if the entire lesion is removed and any atypia is not significant, we can appropriately watch and monitor the site for recurrence.
Consent was obtained from the patient. The risks and benefits to therapy were discussed in detail. Specifically, the risks of infection, scarring, bleeding, prolonged wound healing, incomplete removal, allergy to anesthesia, nerve injury and recurrence were addressed. Prior to the procedure, the treatment site was clearly identified and confirmed by the patient. All components of Universal Protocol/PAUSE Rule completed.
Billing Type: Third-Party Bill
Hemostasis: Drysol
Biopsy Method: Personna blade
Medical Necessity Information: It is in your best interest to select a reason for this procedure from the list below. All of these items fulfill various CMS LCD requirements except the new and changing color options.
Medical Necessity Clause: This procedure was medically necessary because the lesion that was treated was:
Lab: -[id number]
Detail Level: Detailed
Anesthesia Type: 1% lidocaine with epinephrine

## (undated) DEVICE — 3M™ MICROFOAM™ SURGICAL TAPE, 2 INCHES X 5 YARDS, 6 ROLLS/CARTON, 6 CARTONS/CASE, 1528-2: Brand: 3M™ MICROFOAM™

## (undated) DEVICE — APPLICATOR,COTTON-TIP,WOOD,3,STRL: Brand: MEDLINE

## (undated) DEVICE — SOLUTION IRRIG 1000ML ST H2O AQUALITE PLAS

## (undated) DEVICE — GOWN,SIRUS,FAB REINF,RAGLAN,L,STERILE: Brand: MEDLINE

## (undated) DEVICE — PILLOW FACE DOWN W/CASE SM

## (undated) DEVICE — SOLUTION IRRIG 250ML 0.9% NACL

## (undated) DEVICE — 25GA SOFT TIP CANNULA: Brand: SYNERGETICS

## (undated) DEVICE — 25+®GA HYPERVIT®  BEVEL 20K CPM TOTAL PLUS® VITRECTOMY PAK: Brand: CONSTELLATION® HYPERVIT® TOTAL PLUS®

## (undated) DEVICE — SOLUTION PREP 30ML 5% POVIDONE IOD EYE BDINE

## (undated) DEVICE — ENCORE® LATEX MICRO SIZE 6.5, STERILE LATEX POWDER-FREE SURGICAL GLOVE: Brand: ENCORE

## (undated) DEVICE — CATHETER URETH 16FR RED RUB INTMIT ALL PURP

## (undated) DEVICE — SYRINGE TB 29G

## (undated) DEVICE — PACK SRG BIOM FULL DRP STRL

## (undated) DEVICE — 60 ML SYRINGE LUER-LOCK TIP: Brand: MONOJECT

## (undated) DEVICE — REVOLUTION DSP 25G ILM FORCEPS: Brand: ALCON GRIESHABER REVOLUTION

## (undated) DEVICE — 25+® FINESSE® FLEX LOOP DSP: Brand: ALCON GRIESHABER 25+ FINESSE

## (undated) DEVICE — ISPAN C3F8 PERFLUOROPROPANEISPAN* C3F8 IS A FLUORINATED GREENHOUSE GAS COVERED BY THE KYOTO PROTOCOL AND HAS A GLOBAL WARMING POTENTIAL (GWP) OF 8,600. RESIDUAL ISPAN* C3F8 GAS SHOULD BE RECOVERED BY APPROPRIATELY QUALIFIED PERSONNEL AND RECYCLED, RECLAIMED OR DESTROYED IN ACCORDANCE WITH LOCAL ORDINANCES.: Brand: ISPAN

## (undated) DEVICE — RETINAL: Brand: MEDLINE INDUSTRIES, INC.

## (undated) DEVICE — SUT PLN GUT 6-0 18IN TG140-8 ABSRB TAN YELLOW

## (undated) DEVICE — SOLUTION IRRIG 500ML BAL SALT 2 CHMBR GLS BSS

## (undated) DEVICE — SYRINGE MED 50ML LL TIP DISP

## (undated) NOTE — LETTER
[facility]  [address], [place], [place]    Authorization for Surgical Operation and Procedure                               I hereby authorize Kar Borja MD, my physician and his/her assistants (if applicable), which may include medical students, residents, and/or fellows, to perform the following surgical operation/ procedure and administer such anesthesia as may be determined necessary by my physician: Operation/Procedure name (s) Pars plana vitrectomy, membrane peeling, gas fluid exchange left eye on Akilah Franco   2.   I recognize that during the surgical operation/procedure, unforeseen conditions may necessitate additional or different procedures than those listed above.  I, therefore, further authorize and request that the above-named surgeon, assistants, or designees perform such procedures as are, in their judgment, necessary and desirable.    3.   My surgeon/physician has discussed prior to my surgery the potential benefits, risks and side effects of this procedure; the likelihood of achieving goals; and potential problems that might occur during recuperation.  They also discussed reasonable alternatives to the procedure, including risks, benefits, and side effects related to the alternatives and risks related to not receiving this procedure.  I have had all my questions answered and I acknowledge that no guarantee has been made as to the result that may be obtained.    4.   Should the need arise during my operation/procedure, which includes change of level of care prior to discharge, I also consent to the administration of blood and/or blood products.  Further, I understand that despite careful testing and screening of blood or blood products by collecting agencies, I may still be subject to ill effects as a result of receiving a blood transfusion and/or blood products.  The following are some, but not all, of the potential risks that can occur: fever and allergic  reactions, hemolytic reactions, transmission of diseases such as Hepatitis, AIDS and Cytomegalovirus (CMV) and fluid overload.  In the event that I wish to have an autologous transfusion of my own blood, or a directed donor transfusion, I will discuss this with my physician.  Check only if Refusing Blood or Blood Products  I understand refusal of blood or blood products as deemed necessary by my physician may have serious consequences to my condition to include possible death. I hereby assume responsibility for my refusal and release the hospital, its personnel, and my physicians from any responsibility for the consequences of my refusal.    o  Refuse   5.   I authorize the use of any specimen, organs, tissues, body parts or foreign objects that may be removed from my body during the operation/procedure for diagnosis, research or teaching purposes and their subsequent disposal by hospital authorities.  I also authorize the release of specimen test results and/or written reports to my treating physician on the hospital medical staff or other referring or consulting physicians involved in my care, at the discretion of the Pathologist or my treating physician.    6.   I consent to the photographing or videotaping of the operations or procedures to be performed, including appropriate portions of my body for medical, scientific, or educational purposes, provided my identity is not revealed by the pictures or by descriptive texts accompanying them.  If the procedure has been photographed/videotaped, the surgeon will obtain the original picture, image, videotape or CD.  The hospital will not be responsible for storage, release or maintenance of the picture, image, tape or CD.    7.   I consent to the presence of a  or observers in the operating room as deemed necessary by my physician or their designees.    8.   I recognize that in the event my procedure results in extended X-Ray/fluoroscopy time, I may  develop a skin reaction.    9. If I have a Do Not Attempt Resuscitation (DNAR) order in place, that status will be suspended while in the operating room, procedural suite, and during the recovery period unless otherwise explicitly stated by me (or a person authorized to consent on my behalf). The surgeon or my attending physician will determine when the applicable recovery period ends for purposes of reinstating the DNAR order.  10. Patients having a sterilization procedure: I understand that if the procedure is successful the results will be permanent and it will therefore be impossible for me to inseminate, conceive, or bear children.  I also understand that the procedure is intended to result in sterility, although the result has not been guaranteed.   11. I acknowledge that my physician has explained sedation/analgesia administration to me including the risk and benefits I consent to the administration of sedation/analgesia as may be necessary or desirable in the judgment of my physician.    I CERTIFY THAT I HAVE READ AND FULLY UNDERSTAND THE ABOVE CONSENT TO OPERATION and/or OTHER PROCEDURE.     ____________________________________  _________________________________        ______________________________  Signature of Patient    Signature of Responsible Person                Printed Name of Responsible Person                                      ____________________________________  _____________________________                ________________________________  Signature of Witness        Date  Time         Relationship to Patient    STATEMENT OF PHYSICIAN My signature below affirms that prior to the time of the procedure; I have explained to the patient and/or his/her legal representative, the risks and benefits involved in the proposed treatment and any reasonable alternative to the proposed treatment. I have also explained the risks and benefits involved in refusal of the proposed treatment and alternatives to  the proposed treatment and have answered the patient's questions. If I have a significant financial interest in a co-management agreement or a significant financial interest in any product or implant, or other significant relationship used in this procedure/surgery, I have disclosed this and had a discussion with my patient.     _____________________________________________________              _____________________________  (Signature of Physician)                                                                                         (Date)                                   (Time)  Patient Name: Akilah DIONNE Franco      : 1970      Printed: 2024     Medical Record #: [id number]                                      Page 1 of 1